# Patient Record
Sex: FEMALE | Race: WHITE | Employment: UNEMPLOYED | URBAN - METROPOLITAN AREA
[De-identification: names, ages, dates, MRNs, and addresses within clinical notes are randomized per-mention and may not be internally consistent; named-entity substitution may affect disease eponyms.]

---

## 2024-09-16 ENCOUNTER — HOSPITAL ENCOUNTER (OUTPATIENT)
Age: 49
Discharge: HOME | End: 2024-09-16
Payer: COMMERCIAL

## 2024-09-16 LAB
ALBUMIN SERPL-MCNC: 4.4 G/DL (ref 3.5–5.2)
ALP SERPL-CCNC: 202 U/L (ref 35–104)
ALT SERPL-CCNC: 69 U/L (ref 0–32)
ANION GAP SERPL CALCULATED.3IONS-SCNC: 12 MMOL/L (ref 7–16)
AST SERPL-CCNC: 62 U/L (ref 0–31)
BASOPHILS # BLD: 0.07 K/UL (ref 0–0.2)
BASOPHILS NFR BLD: 1 % (ref 0–2)
BILIRUB SERPL-MCNC: 0.2 MG/DL (ref 0–1.2)
BUN SERPL-MCNC: 7 MG/DL (ref 6–20)
CALCIUM SERPL-MCNC: 9.2 MG/DL (ref 8.6–10.2)
CHLORIDE SERPL-SCNC: 101 MMOL/L (ref 98–107)
CO2 SERPL-SCNC: 25 MMOL/L (ref 22–29)
CREAT SERPL-MCNC: 0.6 MG/DL (ref 0.5–1)
EOSINOPHIL # BLD: 0.08 K/UL (ref 0.05–0.5)
EOSINOPHILS RELATIVE PERCENT: 1 % (ref 0–6)
ERYTHROCYTE [DISTWIDTH] IN BLOOD BY AUTOMATED COUNT: 13.8 % (ref 11.5–15)
GFR, ESTIMATED: >90 ML/MIN/1.73M2
GLUCOSE SERPL-MCNC: 95 MG/DL (ref 74–99)
HCT VFR BLD AUTO: 37.3 % (ref 34–48)
HGB BLD-MCNC: 11.5 G/DL (ref 11.5–15.5)
IMM GRANULOCYTES # BLD AUTO: <0.03 K/UL (ref 0–0.58)
IMM GRANULOCYTES NFR BLD: 0 % (ref 0–5)
LYMPHOCYTES NFR BLD: 2.68 K/UL (ref 1.5–4)
LYMPHOCYTES RELATIVE PERCENT: 38 % (ref 20–42)
MCH RBC QN AUTO: 29.1 PG (ref 26–35)
MCHC RBC AUTO-ENTMCNC: 30.8 G/DL (ref 32–34.5)
MCV RBC AUTO: 94.4 FL (ref 80–99.9)
MONOCYTES NFR BLD: 0.57 K/UL (ref 0.1–0.95)
MONOCYTES NFR BLD: 8 % (ref 2–12)
NEUTROPHILS NFR BLD: 52 % (ref 43–80)
NEUTS SEG NFR BLD: 3.68 K/UL (ref 1.8–7.3)
PLATELET # BLD AUTO: 370 K/UL (ref 130–450)
PMV BLD AUTO: 9.8 FL (ref 7–12)
POTASSIUM SERPL-SCNC: 4.5 MMOL/L (ref 3.5–5)
PROT SERPL-MCNC: 7.6 G/DL (ref 6.4–8.3)
RBC # BLD AUTO: 3.95 M/UL (ref 3.5–5.5)
SODIUM SERPL-SCNC: 138 MMOL/L (ref 132–146)
TSH SERPL DL<=0.05 MIU/L-ACNC: 1.82 UIU/ML (ref 0.27–4.2)
WBC OTHER # BLD: 7.1 K/UL (ref 4.5–11.5)

## 2024-09-16 PROCEDURE — 84443 ASSAY THYROID STIM HORMONE: CPT

## 2024-09-16 PROCEDURE — 80053 COMPREHEN METABOLIC PANEL: CPT

## 2024-09-16 PROCEDURE — 36415 COLL VENOUS BLD VENIPUNCTURE: CPT

## 2024-09-16 PROCEDURE — 85025 COMPLETE CBC W/AUTO DIFF WBC: CPT

## 2024-12-19 ENCOUNTER — HOSPITAL ENCOUNTER (OUTPATIENT)
Age: 49
Discharge: HOME | End: 2024-12-19
Payer: COMMERCIAL

## 2024-12-19 LAB
ALBUMIN SERPL-MCNC: 4.3 G/DL (ref 3.5–5.2)
ALP SERPL-CCNC: 167 U/L (ref 35–104)
ALT SERPL-CCNC: 25 U/L (ref 0–32)
ANION GAP SERPL CALCULATED.3IONS-SCNC: 9 MMOL/L (ref 7–16)
AST SERPL-CCNC: 25 U/L (ref 0–31)
BASOPHILS # BLD: 0.09 K/UL (ref 0–0.2)
BASOPHILS NFR BLD: 2 % (ref 0–2)
BILIRUB SERPL-MCNC: 0.2 MG/DL (ref 0–1.2)
BUN SERPL-MCNC: 7 MG/DL (ref 6–20)
CALCIUM SERPL-MCNC: 9.4 MG/DL (ref 8.6–10.2)
CHLORIDE SERPL-SCNC: 103 MMOL/L (ref 98–107)
CO2 SERPL-SCNC: 26 MMOL/L (ref 22–29)
CREAT SERPL-MCNC: 0.7 MG/DL (ref 0.5–1)
EOSINOPHIL # BLD: 0.08 K/UL (ref 0.05–0.5)
EOSINOPHILS RELATIVE PERCENT: 1 % (ref 0–6)
ERYTHROCYTE [DISTWIDTH] IN BLOOD BY AUTOMATED COUNT: 14.5 % (ref 11.5–15)
GFR, ESTIMATED: >90 ML/MIN/1.73M2
GLUCOSE SERPL-MCNC: 108 MG/DL (ref 74–99)
HCT VFR BLD AUTO: 38.2 % (ref 34–48)
HGB BLD-MCNC: 12 G/DL (ref 11.5–15.5)
IMM GRANULOCYTES # BLD AUTO: <0.03 K/UL (ref 0–0.58)
IMM GRANULOCYTES NFR BLD: 0 % (ref 0–5)
LYMPHOCYTES NFR BLD: 2.86 K/UL (ref 1.5–4)
LYMPHOCYTES RELATIVE PERCENT: 47 % (ref 20–42)
MCH RBC QN AUTO: 29 PG (ref 26–35)
MCHC RBC AUTO-ENTMCNC: 31.4 G/DL (ref 32–34.5)
MCV RBC AUTO: 92.3 FL (ref 80–99.9)
MONOCYTES NFR BLD: 0.43 K/UL (ref 0.1–0.95)
MONOCYTES NFR BLD: 7 % (ref 2–12)
NEUTROPHILS NFR BLD: 43 % (ref 43–80)
NEUTS SEG NFR BLD: 2.61 K/UL (ref 1.8–7.3)
PLATELET # BLD AUTO: 400 K/UL (ref 130–450)
PMV BLD AUTO: 9.5 FL (ref 7–12)
POTASSIUM SERPL-SCNC: 3.7 MMOL/L (ref 3.5–5)
PROT SERPL-MCNC: 7.8 G/DL (ref 6.4–8.3)
RBC # BLD AUTO: 4.14 M/UL (ref 3.5–5.5)
SODIUM SERPL-SCNC: 138 MMOL/L (ref 132–146)
TSH SERPL DL<=0.05 MIU/L-ACNC: 2.16 UIU/ML (ref 0.27–4.2)
WBC OTHER # BLD: 6.1 K/UL (ref 4.5–11.5)

## 2024-12-19 PROCEDURE — 84443 ASSAY THYROID STIM HORMONE: CPT

## 2024-12-19 PROCEDURE — 80053 COMPREHEN METABOLIC PANEL: CPT

## 2024-12-19 PROCEDURE — 85025 COMPLETE CBC W/AUTO DIFF WBC: CPT

## 2025-02-04 ENCOUNTER — OFFICE VISIT (OUTPATIENT)
Dept: RHEUMATOLOGY | Age: 50
End: 2025-02-04
Payer: COMMERCIAL

## 2025-02-04 VITALS
WEIGHT: 182 LBS | SYSTOLIC BLOOD PRESSURE: 146 MMHG | DIASTOLIC BLOOD PRESSURE: 90 MMHG | HEIGHT: 60 IN | RESPIRATION RATE: 14 BRPM | BODY MASS INDEX: 35.73 KG/M2 | OXYGEN SATURATION: 96 % | TEMPERATURE: 97 F | HEART RATE: 78 BPM

## 2025-02-04 DIAGNOSIS — M79.7 FIBROMYALGIA: Primary | ICD-10-CM

## 2025-02-04 DIAGNOSIS — M25.50 MULTIPLE JOINT PAIN: ICD-10-CM

## 2025-02-04 DIAGNOSIS — M19.90 ARTHRITIS: ICD-10-CM

## 2025-02-04 DIAGNOSIS — Z79.899 HIGH RISK MEDICATION USE: ICD-10-CM

## 2025-02-04 PROCEDURE — 99212 OFFICE O/P EST SF 10 MIN: CPT | Performed by: INTERNAL MEDICINE

## 2025-02-04 RX ORDER — HYDROXYCHLOROQUINE SULFATE 200 MG/1
400 TABLET, FILM COATED ORAL DAILY
Qty: 30 TABLET | Refills: 2 | Status: SHIPPED | OUTPATIENT
Start: 2025-02-04 | End: 2026-02-04

## 2025-02-04 NOTE — PROGRESS NOTES
J O I N T  C A R E  C L I N I C        The patient is seen in follow-up for:  Last seen by my August 2023 (1.5 years ago)    Chronic GI issues , asthma,     Still on cymbalta   passed away last year  Multiple pain flares since hcq x2 daily, ran out weeks ago    anxiety  Inflammatory arthritis / +FMS   B.l. knee pain OA  Back pain- hot bath/TENS unit, massager gun         Patient History Update Since Previous Visit      Yes No Comment      New illnesses x   Covered issues over past 1.5 years      Seen other healthcare provider x        New x-ray, labs, or procedures  x       Started / changed / stopped medications x        New allergies / reactions to medications  x       Changes in family medical history x  Passed       Changes in patient social history x        Morning stiffness   Length: hot bath in am helps      Worst Joint:        Abbreviated Exam    System    nl   abn   Comment     1 Gen nutrition/hygiene x      appearance x     2 Skin turgor / integrity x      rash x      ecchymosis x     3 Psych orientation x      memory x      mood x      cooperative x     4 HENT mouth / throat x     5 Resp resp. effort x      auscultation x     6 CV heart auscultation x      extremity edema x     7 Other             Joint Assessment      Patient Right     Patient Left     x (check if no synovitis seen on exam)   Joint Pain Swelling Joint Pain Swelling   Shoulder   Shoulder     Elbow   Elbow     Wrist   Wrist     Knee   Knee     MCP I   MCP I     MCP II   MCP II     MCP III   MCP III     MCP IV   MCP IV     MCP V   MCP V     IP I   IP I     PIP II   PIP II     PIP III   PIP III     PIP IV   PIP IV     PIP V   PIP V             Generalized Ligament Laxity Prior Test / Diagnostics    Yes  No  Equivocal        Tender Points    xx Yes  No  Equivocal         GTB pain / tenderness     SI pain / tenderness     Heberden’s Nodes         Impression       Inflammatory arthritis / FMS - not SLE (HCQ 0.5 tab twice daily)

## 2025-02-07 ENCOUNTER — HOSPITAL ENCOUNTER (OUTPATIENT)
Age: 50
Discharge: HOME | End: 2025-02-07

## 2025-02-07 DIAGNOSIS — M25.50 MULTIPLE JOINT PAIN: ICD-10-CM

## 2025-02-07 DIAGNOSIS — M19.90 ARTHRITIS: ICD-10-CM

## 2025-02-11 ENCOUNTER — HOSPITAL ENCOUNTER (OUTPATIENT)
Age: 50
Discharge: HOME | End: 2025-02-11
Payer: COMMERCIAL

## 2025-02-11 LAB
C4 SERPL-MCNC: 33 MG/DL (ref 10–40)
CRP SERPL HS-MCNC: 4 MG/L (ref 0–5)
ERYTHROCYTE [SEDIMENTATION RATE] IN BLOOD BY WESTERGREN METHOD: 11 MM/HR (ref 0–20)

## 2025-02-11 PROCEDURE — 86235 NUCLEAR ANTIGEN ANTIBODY: CPT

## 2025-02-11 PROCEDURE — 36415 COLL VENOUS BLD VENIPUNCTURE: CPT

## 2025-02-11 PROCEDURE — 86160 COMPLEMENT ANTIGEN: CPT

## 2025-02-11 PROCEDURE — 85652 RBC SED RATE AUTOMATED: CPT

## 2025-02-11 PROCEDURE — 86225 DNA ANTIBODY NATIVE: CPT

## 2025-02-11 PROCEDURE — 86140 C-REACTIVE PROTEIN: CPT

## 2025-02-12 LAB
ANTI DNA DOUBLE STRANDED: NEGATIVE
ENA SM AB SER QL: NEGATIVE

## 2025-04-01 ENCOUNTER — OFFICE VISIT (OUTPATIENT)
Dept: RHEUMATOLOGY | Age: 50
End: 2025-04-01
Payer: COMMERCIAL

## 2025-04-01 VITALS
WEIGHT: 185.5 LBS | TEMPERATURE: 97.7 F | DIASTOLIC BLOOD PRESSURE: 121 MMHG | RESPIRATION RATE: 18 BRPM | BODY MASS INDEX: 36.42 KG/M2 | HEIGHT: 60 IN | OXYGEN SATURATION: 98 % | HEART RATE: 97 BPM | SYSTOLIC BLOOD PRESSURE: 194 MMHG

## 2025-04-01 DIAGNOSIS — Z79.899 HIGH RISK MEDICATION USE: ICD-10-CM

## 2025-04-01 DIAGNOSIS — M79.7 FIBROMYALGIA: ICD-10-CM

## 2025-04-01 DIAGNOSIS — M25.50 MULTIPLE JOINT PAIN: ICD-10-CM

## 2025-04-01 DIAGNOSIS — M19.90 ARTHRITIS: Primary | ICD-10-CM

## 2025-04-01 PROCEDURE — 1036F TOBACCO NON-USER: CPT | Performed by: INTERNAL MEDICINE

## 2025-04-01 PROCEDURE — 3077F SYST BP >= 140 MM HG: CPT | Performed by: INTERNAL MEDICINE

## 2025-04-01 PROCEDURE — 3080F DIAST BP >= 90 MM HG: CPT | Performed by: INTERNAL MEDICINE

## 2025-04-01 PROCEDURE — G8427 DOCREV CUR MEDS BY ELIG CLIN: HCPCS | Performed by: INTERNAL MEDICINE

## 2025-04-01 PROCEDURE — 99212 OFFICE O/P EST SF 10 MIN: CPT | Performed by: INTERNAL MEDICINE

## 2025-04-01 PROCEDURE — 3017F COLORECTAL CA SCREEN DOC REV: CPT | Performed by: INTERNAL MEDICINE

## 2025-04-01 PROCEDURE — G8417 CALC BMI ABV UP PARAM F/U: HCPCS | Performed by: INTERNAL MEDICINE

## 2025-04-01 PROCEDURE — 99214 OFFICE O/P EST MOD 30 MIN: CPT | Performed by: INTERNAL MEDICINE

## 2025-04-01 RX ORDER — HYDROXYCHLOROQUINE SULFATE 200 MG/1
400 TABLET, FILM COATED ORAL DAILY
Qty: 180 TABLET | Refills: 1 | Status: SHIPPED | OUTPATIENT
Start: 2025-04-01 | End: 2026-04-01

## 2025-04-01 NOTE — PROGRESS NOTES
J O I N T  C A R E  C L I N I C        The patient is seen in follow-up for: Fibromyalgia    Chronic GI issues , asthma,  depression etc..  Still on cymbalta   passed away last year  Multiple pain flares- when off hcq x2 daily, improved back on HCQ     anxiety  Inflammatory arthritis / +FMS   B.l. knee pain OA  Back pain- hot bath/TENS unit, massager gun    Crp  4 <5  Sed rate 11  Neg ds DNA, webb c4 N  Restarted hcq since last visit in FEB (off med >6 months)  Some lightheadedness when first started  But dec stiffnes in am, dec finger swelling           Patient History Update Since Previous Visit      Yes No Comment      New illnesses  x       Seen other healthcare provider  x       New x-ray, labs, or procedures x  Labs      Started / changed / stopped medications  x       New allergies / reactions to medications  x       Changes in family medical history  x       Changes in patient social history  x       Morning stiffness x  Length: 1.5 hours      Worst Joint:Bilateral hips and Knees        Abbreviated Exam    System    nl   abn   Comment     1 Gen nutrition/hygiene x      appearance x     2 Skin turgor / integrity x      rash x      ecchymosis x     3 Psych orientation x      memory x      mood x      cooperative x     4 HENT mouth / throat x     5 Resp resp. effort x      auscultation x     6 CV heart auscultation x      extremity edema x     7 Other             Joint Assessment      Patient Right     Patient Left     x (check if no synovitis seen on exam)   Joint Pain Swelling Joint Pain Swelling   Shoulder   Shoulder     Elbow   Elbow     Wrist   Wrist     Knee   Knee     MCP I   MCP I     MCP II   MCP II     MCP III   MCP III     MCP IV   MCP IV     MCP V   MCP V     IP I   IP I     PIP II   PIP II     PIP III   PIP III     PIP IV   PIP IV     PIP V   PIP V             Generalized Ligament Laxity Prior Test / Diagnostics   xx Yes  No  Equivocal        Tender Points     Yes  No xx Equivocal

## 2025-04-01 NOTE — PROGRESS NOTES
Patient verbalized understanding of office instructions and will call with any questions or concerns.Patient  was given discharge instructions and lab scripts to be completed before next office visit. All questions were fully answered.Follow up appoint scheduled. AVS given to pt.

## 2025-07-02 ENCOUNTER — APPOINTMENT (OUTPATIENT)
Dept: CT IMAGING | Age: 50
End: 2025-07-02
Payer: COMMERCIAL

## 2025-07-02 ENCOUNTER — HOSPITAL ENCOUNTER (EMERGENCY)
Age: 50
Discharge: HOME | End: 2025-07-02
Attending: EMERGENCY MEDICINE
Payer: COMMERCIAL

## 2025-07-02 VITALS
SYSTOLIC BLOOD PRESSURE: 175 MMHG | HEART RATE: 87 BPM | TEMPERATURE: 99 F | OXYGEN SATURATION: 100 % | DIASTOLIC BLOOD PRESSURE: 101 MMHG | RESPIRATION RATE: 16 BRPM

## 2025-07-02 DIAGNOSIS — R10.30 LOWER ABDOMINAL PAIN: Primary | ICD-10-CM

## 2025-07-02 LAB
ALBUMIN SERPL-MCNC: 4.3 G/DL (ref 3.5–5.2)
ALP SERPL-CCNC: 143 U/L (ref 35–104)
ALT SERPL-CCNC: 36 U/L (ref 0–35)
ANION GAP SERPL CALCULATED.3IONS-SCNC: 12 MMOL/L (ref 7–16)
AST SERPL-CCNC: 36 U/L (ref 0–35)
BASOPHILS # BLD: 0.08 K/UL (ref 0–0.2)
BASOPHILS NFR BLD: 1 % (ref 0–2)
BILIRUB SERPL-MCNC: 0.3 MG/DL (ref 0–1.2)
BILIRUB UR QL STRIP: NEGATIVE
BUN SERPL-MCNC: 7 MG/DL (ref 6–20)
CALCIUM SERPL-MCNC: 8.9 MG/DL (ref 8.6–10)
CHLORIDE SERPL-SCNC: 104 MMOL/L (ref 98–107)
CLARITY UR: CLEAR
CO2 SERPL-SCNC: 23 MMOL/L (ref 22–29)
COLOR UR: YELLOW
CREAT SERPL-MCNC: 0.6 MG/DL (ref 0.5–1)
EOSINOPHIL # BLD: 0.06 K/UL (ref 0.05–0.5)
EOSINOPHILS RELATIVE PERCENT: 1 % (ref 0–6)
EPI CELLS #/AREA URNS HPF: ABNORMAL /HPF
ERYTHROCYTE [DISTWIDTH] IN BLOOD BY AUTOMATED COUNT: 14.5 % (ref 11.5–15)
GFR, ESTIMATED: >90 ML/MIN/1.73M2
GLUCOSE SERPL-MCNC: 91 MG/DL (ref 74–99)
GLUCOSE UR STRIP-MCNC: NEGATIVE MG/DL
HCT VFR BLD AUTO: 35.4 % (ref 34–48)
HGB BLD-MCNC: 11.8 G/DL (ref 11.5–15.5)
HGB UR QL STRIP.AUTO: NEGATIVE
IMM GRANULOCYTES # BLD AUTO: 0.03 K/UL (ref 0–0.58)
IMM GRANULOCYTES NFR BLD: 0 % (ref 0–5)
KETONES UR STRIP-MCNC: NEGATIVE MG/DL
LEUKOCYTE ESTERASE UR QL STRIP: ABNORMAL
LYMPHOCYTES NFR BLD: 2.76 K/UL (ref 1.5–4)
LYMPHOCYTES RELATIVE PERCENT: 40 % (ref 20–42)
MCH RBC QN AUTO: 31.1 PG (ref 26–35)
MCHC RBC AUTO-ENTMCNC: 33.3 G/DL (ref 32–34.5)
MCV RBC AUTO: 93.2 FL (ref 80–99.9)
MONOCYTES NFR BLD: 0.43 K/UL (ref 0.1–0.95)
MONOCYTES NFR BLD: 6 % (ref 2–12)
NEUTROPHILS NFR BLD: 52 % (ref 43–80)
NEUTS SEG NFR BLD: 3.62 K/UL (ref 1.8–7.3)
NITRITE UR QL STRIP: NEGATIVE
PH UR STRIP: 6 [PH] (ref 5–8)
PLATELET # BLD AUTO: 451 K/UL (ref 130–450)
PMV BLD AUTO: 9.7 FL (ref 7–12)
POTASSIUM SERPL-SCNC: 3.6 MMOL/L (ref 3.5–5.1)
PROT SERPL-MCNC: 7.5 G/DL (ref 6.4–8.3)
PROT UR STRIP-MCNC: NEGATIVE MG/DL
RBC # BLD AUTO: 3.8 M/UL (ref 3.5–5.5)
RBC #/AREA URNS HPF: ABNORMAL /HPF
SODIUM SERPL-SCNC: 138 MMOL/L (ref 136–145)
SP GR UR STRIP: 1.01 (ref 1–1.03)
UROBILINOGEN UR STRIP-ACNC: 0.2 EU/DL (ref 0–1)
WBC #/AREA URNS HPF: ABNORMAL /HPF
WBC OTHER # BLD: 7 K/UL (ref 4.5–11.5)

## 2025-07-02 PROCEDURE — 2580000003 HC RX 258: Performed by: EMERGENCY MEDICINE

## 2025-07-02 PROCEDURE — 6370000000 HC RX 637 (ALT 250 FOR IP): Performed by: EMERGENCY MEDICINE

## 2025-07-02 PROCEDURE — 80053 COMPREHEN METABOLIC PANEL: CPT

## 2025-07-02 PROCEDURE — 74176 CT ABD & PELVIS W/O CONTRAST: CPT

## 2025-07-02 PROCEDURE — 85025 COMPLETE CBC W/AUTO DIFF WBC: CPT

## 2025-07-02 PROCEDURE — 81001 URINALYSIS AUTO W/SCOPE: CPT

## 2025-07-02 PROCEDURE — 87086 URINE CULTURE/COLONY COUNT: CPT

## 2025-07-02 PROCEDURE — 99284 EMERGENCY DEPT VISIT MOD MDM: CPT

## 2025-07-02 RX ORDER — KETOROLAC TROMETHAMINE 30 MG/ML
30 INJECTION, SOLUTION INTRAMUSCULAR; INTRAVENOUS ONCE
Status: DISCONTINUED | OUTPATIENT
Start: 2025-07-02 | End: 2025-07-02

## 2025-07-02 RX ORDER — PROMETHAZINE HYDROCHLORIDE 25 MG/1
25 SUPPOSITORY RECTAL EVERY 6 HOURS PRN
Qty: 10 SUPPOSITORY | Refills: 0 | Status: SHIPPED | OUTPATIENT
Start: 2025-07-02 | End: 2025-07-09

## 2025-07-02 RX ORDER — DICYCLOMINE HYDROCHLORIDE 10 MG/1
10 CAPSULE ORAL EVERY 6 HOURS PRN
Qty: 20 CAPSULE | Refills: 0 | Status: SHIPPED | OUTPATIENT
Start: 2025-07-02 | End: 2025-07-07

## 2025-07-02 RX ORDER — 0.9 % SODIUM CHLORIDE 0.9 %
1000 INTRAVENOUS SOLUTION INTRAVENOUS ONCE
Status: COMPLETED | OUTPATIENT
Start: 2025-07-02 | End: 2025-07-02

## 2025-07-02 RX ORDER — OXYCODONE AND ACETAMINOPHEN 5; 325 MG/1; MG/1
2 TABLET ORAL ONCE
Refills: 0 | Status: COMPLETED | OUTPATIENT
Start: 2025-07-02 | End: 2025-07-02

## 2025-07-02 RX ORDER — IOPAMIDOL 755 MG/ML
75 INJECTION, SOLUTION INTRAVASCULAR
Status: DISCONTINUED | OUTPATIENT
Start: 2025-07-02 | End: 2025-07-02

## 2025-07-02 RX ADMIN — OXYCODONE AND ACETAMINOPHEN 2 TABLET: 5; 325 TABLET ORAL at 18:34

## 2025-07-02 RX ADMIN — SODIUM CHLORIDE 1000 ML: 0.9 INJECTION, SOLUTION INTRAVENOUS at 18:35

## 2025-07-02 ASSESSMENT — PAIN DESCRIPTION - LOCATION: LOCATION: ABDOMEN

## 2025-07-02 ASSESSMENT — PAIN SCALES - GENERAL: PAINLEVEL_OUTOF10: 10

## 2025-07-02 ASSESSMENT — PAIN - FUNCTIONAL ASSESSMENT: PAIN_FUNCTIONAL_ASSESSMENT: 0-10

## 2025-07-02 ASSESSMENT — PAIN DESCRIPTION - DESCRIPTORS: DESCRIPTORS: DISCOMFORT

## 2025-07-02 ASSESSMENT — PAIN DESCRIPTION - ORIENTATION: ORIENTATION: RIGHT;LOWER

## 2025-07-02 NOTE — ED PROVIDER NOTES
HPI:  7/2/25,   Time: 5:53 PM EDT       Maggie Montez is a 49 y.o. female presenting to the ED for rlq pain, beginning 1 day ago.  The complaint has been persistent, mild in severity, and worsened by nothing.  Positive nausea vomiting diarrhea.  No fevers chills or sweats.  Thinks may be ovarian cyst.  Nothing makes better.  Sharp in nature.  Does still have appendix.  No urinary symptoms    Review of Systems:   Pertinent positives and negatives are stated within HPI, all other systems reviewed and are negative.          --------------------------------------------- PAST HISTORY ---------------------------------------------  Past Medical History:  has a past medical history of Abnormal Pap smear of cervix, Anemia, Anxiety and depression, Asthma, Bleeding tendency, Bruising tendency, Cerebral artery occlusion with cerebral infarction (HCC), Connective tissue disease, Cyst of right kidney, Cyst of right ovary, Fatty liver, Fibromyalgia, Gastric ulcer, Gastroparesis, Heart murmur, Heart palpitations, Hiatal hernia, Hyperlipidemia, Hypertension, Immunocompromised patient, Incisional hernia, Learning disability, Lupus, Migraines, MONTERO (nonalcoholic steatohepatitis), Panic attacks, and Stomach pain.    Past Surgical History:  has a past surgical history that includes Cholecystectomy; Tonsillectomy; [place] tooth extraction; hernia repair; Adenoidectomy; Colonoscopy; Echo Complete (2/16/2014); Endoscopy, colon, diagnostic (4/20/2015); Cholecystectomy, laparoscopic; Abdomen surgery; Stomach surgery (N/A, 12/16/2015); Upper gastrointestinal endoscopy (07/05/2017); Cardiac catheterization (2/18/2014); Hysterectomy (2010); Upper gastrointestinal endoscopy (N/A, 1/20/2020); and ventral hernia repair (N/A, 2/27/2024).    Social History:  reports that she quit smoking about 31 years ago. Her smoking use included cigarettes. She has never used smokeless tobacco. She reports that she does not currently use alcohol. She reports

## 2025-07-02 NOTE — ED TRIAGE NOTES
Department of Emergency Medicine  FIRST PROVIDER TRIAGE NOTE             Independent MLP           7/2/25  2:51 PM EDT    Date of Encounter: 7/2/25   MRN: [id number]      HPI: Maggie Montez is a 49 y.o. female who presents to the ED for Abdominal Pain (RLQ- hx of ovarian cyst, nausea)       ROS: Negative for cp, Suicidal ideation, or Homicidal Ideation.    PE: Gen Appearance/Constitutional: alert  CV: tachycardia  GI: tender to palpation - RUQ & RLQ     Initial Plan of Care: All treatment areas with department are currently occupied. Plan to order/Initiate the following while awaiting opening in ED: Urinalysis with microscopic and urine culture.  Initiate Treatment-Testing, Proceed toTreatment Area When Bed Available for ED Attending/MLP to Continue Care    The provider-patient relationship was only established for Provider In Triage (PIT) note.  A full assessment, HPI, and examination was not performed and therefore it is not yet possible to state whether or not an emergency medical condition exists.  This is not a comprehensive evaluation.  The provider-patient relationship was terminated after triage completion.    Electronically signed by PETER Dinh CNP   DD: 7/2/25

## 2025-07-02 NOTE — ED NOTES
Radiology Procedure Waiver   Name: Maggie Montez  : 1975  MRN: [id number]    Date:  25    Time: 5:54 PM EDT    Benefits of immediately proceeding with Radiology exam(s) without pre-testing outweigh the risks or are not indicated as specified below and therefore the following is/are being waived:    [] Pregnancy test   [] Patients LMP on-time and regular.   [] Patient had Tubal Ligation or has other Contraception Device.   [] Patient  is Menopausal or Premenarcheal.    [x] Patient had Full or Partial Hysterectomy.    [] Protocol for Iodine allergy    [] MRI Questionnaire     [] BUN/Creatinine   [] Patient age w/no hx of renal dysfunction.   [] Patient on Dialysis.   [] Recent Normal Labs.  Electronically signed by Ba Gutiérrez MD on 25 at 5:54 PM EDT               Ba Gutiérrez MD  [phone]

## 2025-07-04 LAB
MICROORGANISM SPEC CULT: ABNORMAL
MICROORGANISM SPEC CULT: ABNORMAL
SERVICE CMNT-IMP: ABNORMAL
SPECIMEN DESCRIPTION: ABNORMAL